# Patient Record
Sex: MALE | Race: WHITE
[De-identification: names, ages, dates, MRNs, and addresses within clinical notes are randomized per-mention and may not be internally consistent; named-entity substitution may affect disease eponyms.]

---

## 2017-06-05 ENCOUNTER — HOSPITAL ENCOUNTER (EMERGENCY)
Dept: HOSPITAL 58 - ED | Age: 52
Discharge: HOME | End: 2017-06-05
Payer: COMMERCIAL

## 2017-06-05 VITALS — DIASTOLIC BLOOD PRESSURE: 99 MMHG | TEMPERATURE: 99.1 F | SYSTOLIC BLOOD PRESSURE: 150 MMHG

## 2017-06-05 VITALS — BODY MASS INDEX: 31.5 KG/M2
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
## 2017-06-05 DIAGNOSIS — F17.210: ICD-10-CM

## 2017-06-05 DIAGNOSIS — S61.022A: Primary | ICD-10-CM

## 2017-06-05 PROCEDURE — 96372 THER/PROPH/DIAG INJ SC/IM: CPT

## 2017-06-05 PROCEDURE — 99282 EMERGENCY DEPT VISIT SF MDM: CPT

## 2017-06-05 NOTE — DI
EXAM:  Three views of the left thumb. 

  

History: Left thumb trauma. 

  

Findings:  No acute fracture or dislocation.  There are two metallic round radiopaque structures viviana
t could be external to the patient projecting over the thumb.  Joint spaces are preserved. 

  

Impression:  No acute fracture.

## 2017-06-05 NOTE — ED.PDOC
General


ED Provider: 


Dr. TERESO TORRES JR





Chief Complaint: Finger Laceration


Stated Complaint: closed with superglue -cut his left thumb on  metal.  

tenderpainful moving thumb.  redness noted.[ End ]99.1 80 16 94% 150/99 9/10  

patient states wound was open and bleeding and he squirted a lot of superglue 

into laceration initially not painful now tender painful slightly swollen.  

disc with poison control- local wound care is non toxic hardens immediately per 

Bharat


Time Seen by Physician: 14:00


Mode of Arrival: Walk-In


Information Source: Patient


Exam Limitations: No limitations


Nursing and Triage Documentation Reviewed and Agree: No





Review of Systems





- Review Of Systems


Constitutional: Reports: No symptoms


Eyes: Reports: No symptoms


Ears, Nose, Mouth, Throat: Reports: No symptoms


Respiratory: Reports: No symptoms


Cardiac: Reports: No symptoms


GI: Reports: No symptoms


: Reports: No symptoms


Musculoskeletal: Reports: Other


Skin: Reports: Lesions (LEFT THUMB WITH 3 CM TENDER LESION CONSISTENT WITH 3 

DAY LACERATION NO OOZINGTENDER ALONG EDGES NO PALABLE FOREIGN BODY DRY 

MODERATELY TENDER)


Neurological: Reports: No symptoms (NEUROVASCULAR INTACT)


Endocrine: Reports: No symptoms


Hematologic/Lymphatic: Reports: No symptoms


All Other Systems: Other





Past Medical History





- Past Medical History


Previously Healthy: Yes


Endocrine: Reports: DM 2, Dyslipidemia


Cardiovascular: Reports: CAD, MI (MI X3   STINT X2    ), Hypertension


Respiratory: Reports: COPD


Hematological: Reports: None


Gastrointestinal: Reports: GERD


Genitourinary: Reports: None


Neuro/Psych: Reports: None


Musculoskeletal: Reports: Arthritis, Back Pain, Joint Pain


Cancer: Reports: None


Other Pertinent Past Medical History: PVD 





- Surgical History


General Surgical History: Reports: Appendectomy, Stent (x2), Orthopedic ( 

spinalfusion), Back Surgery





- Family History


Family History: Reports: None





- Social History


Smoking Status: Current every day smoker, Heavy tobacco smoker


Hx Substance Use: Yes ("smoke a little weed every now and then.")


Alcohol Screening: None





- Immunizations


Tetanus Shot up to Date: Yes (2 year ago)





Physical Exam





- Physical Exam


Appearance: Ill-appearing


Pain Distress: Moderate


Neck: Supple


Respiratory: Airway patent


Skin: Warm, Dry (NOTE LESION)


Neurological: Sensation intact, Motor intact, Reflexes intact, Cranial nerves 

intact, Alert, Oriented





Critical Care Note





- Critical Care Note


Total Time (mins): 0





Course





- Course


Orders, Labs, Meds: 


Orders











 Category Date Time Status


 


 Ketorolac Tromethamine [Toradol] MEDS  06/05/17 14:13 Discontinued





 60 mg IM ONCE STA   


 


 THUMB, LEFT Stat RADS  06/05/17 14:12 Completed








Medications














Discontinued Medications














Generic Name Dose Route Start Last Admin





  Trade Name Freq  PRN Reason Stop Dose Admin


 


Ketorolac Tromethamine  60 mg  06/05/17 14:13  06/05/17 14:24





  Toradol  IM  06/05/17 14:14  60 mg





  ONCE STA   Administration











Vital Signs: 


 











  Temp Pulse Resp BP Pulse Ox


 


 06/05/17 13:50  99.1 F  80  16  150/99 H  94 L














Departure





- Departure


Time of Disposition: 15:02


Disposition: HOME SELF-CARE


Discharge Problem: 


 Laceration of finger, Foreign object left in body during injection or 

vaccination





Instructions:  Cellulitis (ED), Soft Tissue Foreign Body (ED), Laceration 

Without Closure (ED)


Condition: Good


Pt referred to PMD for follow-up: Yes


Additional Instructions: 


ANTIBIOTIC UNTIL GONE


RECHECK PMD DISCUSS POSSIBLE REFERRAL FOR FOREIGN MATTER UNDER SKIN


MAY USE TOPICAL OINTMENT ON LACERATION 


DAILY BANDAGE


CYANOACRYLATE(SUPERGLUE) MAY CONE TO SURFACE DURING OR AFTER HEALING


Prescriptions: 


Naproxen [Naprosyn] 500 mg PO Q12HR PRN #30 tablet


 PRN Reason: PAIN


Cephalexin [Keflex] 500 mg PO QID #20 capsule


Allergies/Adverse Reactions: 


Allergies





cyclobenzaprine HCl [From Flexeril] Adverse Reaction (Verified 06/05/17 13:52)


 


diphenhydramine HCl [From Benadryl] Adverse Reaction (Verified 06/05/17 13:52)


 


morphine Adverse Reaction (Verified 06/05/17 13:52)


 


promethazine HCl [From Phenergan] Adverse Reaction (Verified 06/05/17 13:52)


 








Home Medications: 


Ambulatory Orders





Ticagrelor [Brilinta] 90 mg PO BID 05/08/14 


Carvedilol [Coreg] 3.125 mg PO BID 07/06/15 


Albuterol Sulfate [Albuterol Sulfate Hfa] 2 puff IH QID 12/29/15 


Hydrocodone/Acetaminophen [Norco 7.5-325 Tablet] 1 each PO Q4HR PRN 12/29/15 


Aspirin [Aspirin Chewable] 81 mg PO DAILYWM 02/18/16 


Atorvastatin Calcium [Lipitor] 20 mg PO DAILY 02/18/16 


Losartan Potassium [Cozaar] 100 mg PO DAILY 12/20/16 


Naproxen [Naprosyn] 500 mg PO Q12HR PRN #30 tablet 12/20/16 


Cephalexin [Keflex] 500 mg PO QID #20 capsule 06/05/17 


Clonazepam 1 mg PO TID 06/05/17 


Naproxen [Naprosyn] 500 mg PO Q12HR PRN #30 tablet 06/05/17

## 2017-07-31 ENCOUNTER — HOSPITAL ENCOUNTER (EMERGENCY)
Dept: HOSPITAL 58 - ED | Age: 52
Discharge: ANOTHER HOSPITAL | End: 2017-07-31
Payer: COMMERCIAL

## 2017-07-31 VITALS — SYSTOLIC BLOOD PRESSURE: 135 MMHG | DIASTOLIC BLOOD PRESSURE: 88 MMHG | TEMPERATURE: 97.8 F

## 2017-07-31 VITALS — BODY MASS INDEX: 30.8 KG/M2

## 2017-07-31 DIAGNOSIS — E78.5: ICD-10-CM

## 2017-07-31 DIAGNOSIS — I25.2: ICD-10-CM

## 2017-07-31 DIAGNOSIS — R07.9: Primary | ICD-10-CM

## 2017-07-31 DIAGNOSIS — E11.9: ICD-10-CM

## 2017-07-31 DIAGNOSIS — I10: ICD-10-CM

## 2017-07-31 DIAGNOSIS — I25.10: ICD-10-CM

## 2017-07-31 DIAGNOSIS — F17.210: ICD-10-CM

## 2017-07-31 DIAGNOSIS — Z95.5: ICD-10-CM

## 2017-07-31 DIAGNOSIS — E87.6: ICD-10-CM

## 2017-07-31 DIAGNOSIS — Z79.899: ICD-10-CM

## 2017-07-31 LAB
ALBUMIN SERPL-MCNC: 4 G/DL (ref 3.4–5)
ALBUMIN/GLOB SERPL: 1.14 {RATIO}
ALP SERPL-CCNC: 64 U/L (ref 50–136)
ALT SERPL-CCNC: 15 U/L (ref 12–78)
ANION GAP SERPL CALC-SCNC: 15.9 MMOL/L
AST SERPL-CCNC: 13 U/L (ref 15–37)
BASOPHILS # BLD AUTO: 0.1 K/UL (ref 0–0.2)
BASOPHILS NFR BLD AUTO: 0.6 % (ref 0–3)
BILIRUB SERPL-MCNC: 0.45 MG/DL (ref 0–1.2)
BUN SERPL-MCNC: 17 MG/DL (ref 7–18)
BUN/CREAT SERPL: 17.17
CALCIUM SERPL-MCNC: 10.9 MG/DL (ref 8.2–10.2)
CHLORIDE SERPL-SCNC: 102 MMOL/L (ref 98–107)
CK MB SERPL-MCNC: 2.3 NG/ML (ref 0–3.6)
CK SERPL-CCNC: 119 U/L
CO2 BLD-SCNC: 29 MMOL/L (ref 21–32)
CREAT SERPL-MCNC: 0.99 MG/DL (ref 0.6–1.1)
EOSINOPHIL # BLD AUTO: 0.2 K/UL (ref 0–0.7)
EOSINOPHIL NFR BLD AUTO: 2.1 % (ref 0–7)
GFR SERPLBLD BASED ON 1.73 SQ M-ARVRAT: 79 ML/MIN
GLOBULIN SER CALC-MCNC: 3.5 G/L
GLUCOSE SERPL-MCNC: 103 MG/DL (ref 70–100)
HCT VFR BLD AUTO: 43.5 % (ref 42–52)
HGB BLD-MCNC: 15.5 G/DL (ref 14–18)
IMM GRANULOCYTES NFR BLD AUTO: 0.4 % (ref 0–5)
LYMPHOCYTES # SPEC AUTO: 2.6 K/UL (ref 0.6–3.4)
LYMPHOCYTES NFR BLD AUTO: 32.2 % (ref 10–50)
MCH RBC QN: 29.5 PG (ref 27–31)
MCHC RBC AUTO-ENTMCNC: 35.6 G/DL (ref 31.8–35.4)
MCV RBC: 82.7 FL (ref 80–94)
MONOCYTES # BLD AUTO: 0.5 K/UL (ref 0.4–2)
MONOCYTES NFR BLD AUTO: 6.2 % (ref 0–10)
NEUTROPHILS # BLD AUTO: 4.8 K/UL (ref 2–6.9)
NEUTROPHILS NFR BLD AUTO: 58.5 %
PLATELET # BLD AUTO: 195 10^3/UL (ref 140–440)
POTASSIUM SERPL-SCNC: 2.9 MMOL/L (ref 3.5–5.1)
PROT SERPL-MCNC: 7.5 G/DL (ref 6.4–8.2)
RBC # BLD AUTO: 5.26 10^6/UL (ref 4.7–6.1)
SODIUM SERPL-SCNC: 144 MMOL/L (ref 136–145)
WBC # BLD AUTO: 8.13 K/UL (ref 4.2–10.2)

## 2017-07-31 PROCEDURE — 99285 EMERGENCY DEPT VISIT HI MDM: CPT

## 2017-07-31 PROCEDURE — 85025 COMPLETE CBC W/AUTO DIFF WBC: CPT

## 2017-07-31 PROCEDURE — 36415 COLL VENOUS BLD VENIPUNCTURE: CPT

## 2017-07-31 PROCEDURE — 84484 ASSAY OF TROPONIN QUANT: CPT

## 2017-07-31 PROCEDURE — 82550 ASSAY OF CK (CPK): CPT

## 2017-07-31 PROCEDURE — 80053 COMPREHEN METABOLIC PANEL: CPT

## 2017-07-31 PROCEDURE — 93005 ELECTROCARDIOGRAM TRACING: CPT

## 2017-07-31 PROCEDURE — 96365 THER/PROPH/DIAG IV INF INIT: CPT

## 2017-07-31 PROCEDURE — 82553 CREATINE MB FRACTION: CPT

## 2017-07-31 PROCEDURE — 93010 ELECTROCARDIOGRAM REPORT: CPT

## 2017-07-31 PROCEDURE — 96375 TX/PRO/DX INJ NEW DRUG ADDON: CPT

## 2017-07-31 NOTE — DI
EXAM:  Two-view chest. 

  

HISTORY:  Pain. 

  

DATE:  07/31/2017. 

  

COMPARISON:  CT of the chest obtained on 04/03/2016. 

  

TECHNIQUE:  PA and lateral views of the chest. 

  

FINDINGS:  No focal consolidation, pleural effusion, or pneumothorax is identified.  The heart size 
is normal.  There are mild degenerative changes of the thoracic spine. 

  

IMPRESSION:  No acute cardiopulmonary findings.

## 2017-07-31 NOTE — ED.PDOC
General


ED Provider: 


Dr. ELIZ FIGUEROA





Chief Complaint: Chest Pain


Stated Complaint: CHEST PAIN


Time Seen by Physician: 15:00 (1 DAY AGO HAS   HX OF CAD AND STENTS )


Mode of Arrival: Walk-In


Information Source: Patient


Exam Limitations: No limitations


Primary Care Provider: 


AGUILA GONCALVES





Nursing and Triage Documentation Reviewed and Agree: Yes





Cardiovascular Complaint Exam





- Chest Pain Complaint/Exam


Onset: Gradual


Symptoms Are: Still present


Initial Severity: Moderate


Current Severity: Moderate


Location: Reports: Midsternal


Pain Radiates: Reports: None


Character: Reports: Dull


Aggravating: Reports: None


Alleviating: Reports: None


Associated Signs and Symptoms: Denies: Diaphoresis, Nausea, Vomiting, Fever, 

Palpitations, Cough, Hemoptysis, Back pain, Abdominal pain, Dizziness, Short of 

air, Calf pain, Calf swelling


Related History: Reports: Similar episode


Related Surgical History: Reports: PTCA/Stent (REMOTE HISTORY)


History of Healthcare-Acquired Pneumonia: Reports: No


TAD Risk Factors: Reports: Hypertension


Pulmonary Embolism Risk Factors: Reports: None


Prior Care for this Complaint: Yes (CAD/MI)


Recent Stress Test: No


Recent Echo/LV Function: No


JVD Present: No


Subcutaneous Emphysema Present: No


Diminshed Breath Sounds: No


Reproducible Chest Wall Pain: No


Bilateral Pulses Present: No


Unequal Pulses Noted: No


If Risk Factors for AMI/ACS Consider: EKG, Cardiac Enzymes


Differential Diagnoses: Acute MI, Unstable Angina, CHF, Lower Resp. Infection


Quality Indicators For Acute MI or Cardiac Chest Pain: EKG in 10min.


Quality Indicator For Non-Traumatic Chest Pain/Syncope: EKG Performed





Review of Systems





- Review Of Systems


Constitutional: Reports: No symptoms


Eyes: Reports: No symptoms


Ears, Nose, Mouth, Throat: Reports: No symptoms


Respiratory: Reports: No symptoms


Cardiac: Reports: Chest pain


GI: Reports: No symptoms


: Reports: No symptoms


Musculoskeletal: Reports: No symptoms


Skin: Reports: No symptoms


Neurological: Reports: No symptoms


Endocrine: Reports: No symptoms


Hematologic/Lymphatic: Reports: No symptoms


All Other Systems: Reviewed and Negative





Past Medical History





- Past Medical History


Previously Healthy: Yes


Endocrine: Reports: DM 2, Dyslipidemia


Cardiovascular: Reports: CAD, MI (MI X3   STINT X2    ), Hypertension


Respiratory: Reports: COPD


Hematological: Reports: None


Gastrointestinal: Reports: GERD


Genitourinary: Reports: None


Neuro/Psych: Reports: None


Musculoskeletal: Reports: Arthritis, Back Pain, Joint Pain


Cancer: Reports: None


Other Pertinent Past Medical History: PVD 





- Surgical History


General Surgical History: Reports: Appendectomy, Stent (x2), Orthopedic ( 

spinalfusion), Back Surgery





- Family History


Family History: Reports: None





- Social History


Smoking Status: Current every day smoker, Heavy tobacco smoker


Hx Substance Use: Yes ("smoke a little weed every now and then.")


Alcohol Screening: None





Physical Exam





- Physical Exam


Appearance: Well-appearing, No pain distress, Well-nourished


Eyes: ALIN, EOMI, Conjunctiva clear


ENT: Ears normal, Nose normal, Oropharynx normal


Respiratory: Airway patent, Breath sounds clear, Breath sounds equal, 

Respirations nonlabored


Cardiovascular: RRR, Pulses normal, No rub, No murmur


GI/: Soft, Nontender, No masses, Bowel sounds normal, No Organomegaly


Musculoskeletal: Normal strength, ROM intact, No edema, No calf tenderness


Skin: Warm, Dry, Normal color


Neurological: Sensation intact, Motor intact, Reflexes intact, Cranial nerves 

intact, Alert, Oriented


Psychiatric: Affect appropriate, Mood appropriate





Interpretation





- Cardiac Monitor


Rate: Normal


Rhythm: Sinus


Ectopy: None





- EKG Interpretation


Rate: Normal


Rhythm: Sinus


Ectopy: None


Axis: NL


ST Segment: Normal





Physician Notification





- Case Discussed


Physician Notified: cheli THOMAS


Time of Notification: 16:57 (TRANSFER NOW )





Critical Care Note





- Critical Care Note


Total Time (mins): 0





Course





- Course


Hematology/Chemistry: 


 07/31/17 15:12





 07/31/17 15:12


Orders, Labs, Meds: 


Lab Review











  07/31/17





  15:12


 


WBC  8.13


 


RBC  5.26


 


Hgb  15.5


 


Hct  43.5


 


MCV  82.7


 


MCH  29.5


 


MCHC  35.6 H


 


RDW Coeff of Portia  13.4


 


Plt Count  195


 


Immature Gran % (Auto)  0.4


 


Neut % (Auto)  58.5


 


Lymph % (Auto)  32.2


 


Mono % (Auto)  6.2


 


Eos % (Auto)  2.1


 


Baso % (Auto)  0.6


 


Immature Gran # (Auto)  0.0


 


Neut #  4.8


 


Lymph #  2.6


 


Mono #  0.5


 


Eos #  0.2


 


Baso #  0.1


 


Sodium  144


 


Potassium  2.9 L


 


Chloride  102


 


Carbon Dioxide  29


 


Anion Gap  15.9


 


BUN  17


 


Creatinine  0.99


 


Estimated GFR (MDRD)  79.00


 


BUN/Creatinine Ratio  17.17


 


Glucose  103 H


 


Calcium  10.9 H


 


Total Bilirubin  0.45


 


AST  13 L


 


ALT  15


 


Alkaline Phosphatase  64


 


Total Creatine Kinase  119


 


CK-MB (CK-2)  2.3


 


CK-MB (CK-2) %  1.81666


 


Troponin I  < 0.0100


 


Total Protein  7.5


 


Albumin  4.0


 


Globulin  3.5


 


Albumin/Globulin Ratio  1.14








Orders











 Category Date Time Status


 


 EKG-(ED ONLY) Stat CARDIO  07/31/17 15:08 Completed


 


 CBC W/ AUTO DIFF Stat LAB  07/31/17 15:12 Completed


 


 COMPREHENSIVE METABOLIC PANEL Stat LAB  07/31/17 15:12 Completed


 


 CREATINE KINASE Stat LAB  07/31/17 15:12 Completed


 


 TROPONIN I Stat LAB  07/31/17 15:12 Completed


 


 Aspirin [Aspirin Chewable] MEDS  07/31/17 15:20 Discontinued





 324 mg PO ONCE STA   


 


 Nitroglycerin [Nitrostat] MEDS  07/31/17 15:23 Discontinued





 0.4 mg SL ONCE STA   


 


 Potassium Chloride [Potassium Chloride Premix Run] 10 MEDS  07/31/17 16:30 

Active





 meq   





 Premix 100 ml Water 1 bag   





 IV ONCE   


 


 Potassium Chloride [Potassium Chloride Premix Run] 100 MEDS  07/31/17 16:48 

Discontinued





 ml   





 IV .STK-MED   


 


 CHEST, 2 VIEWS PA & LAT Stat RADS  07/31/17 15:07 Completed








Medications











Generic Name Dose Route Start Last Admin





  Trade Name Freq  PRN Reason Stop Dose Admin


 


Potassium Chloride 10 meq/  100 mls @ 100 mls/hr  07/31/17 16:30  





  Sterile Water  IV  07/31/17 17:29  





  ONCE STA   














Discontinued Medications














Generic Name Dose Route Start Last Admin





  Trade Name Freq  PRN Reason Stop Dose Admin


 


Aspirin  324 mg  07/31/17 15:20  07/31/17 15:28





  Aspirin Chewable  PO  07/31/17 15:21  324 mg





  ONCE STA   Administration


 


Nitroglycerin  0.4 mg  07/31/17 15:23  07/31/17 15:29





  Nitrostat  SL  07/31/17 15:24  0.4 mg





  ONCE STA   Administration











Vital Signs: 


 











  Temp Pulse Resp BP Pulse Ox


 


 07/31/17 14:51  97.8 F  90  16  135/88  98














LUISANA Risk Score


LUISANA Risk Score: 


Risk Score      Odds of death by 30D


      0                 0.1 (0.1-0.2)


      1                 0.3 (0.2-0.3)


      2                 0.4 (0.3-0.5)


      3                 0.7 (0.6-0.9)


      4                 1.2 (1.0-1.5)


      5                 2.2 (1.9-2.6)


      6                 3.0 (2.5-3.6)


      7                 4.8 (3.8-6.1)








Departure





- Departure


Time of Disposition: 18:00


Disposition: TSF SHORT-TRM HOSP


Discharge Problem: 


 Chest pain, Hypokalemia





Instructions:  Chest Pain (ED)


Condition: Good


Pt referred to PMD for follow-up: Yes


Additional Instructions: 


Please call your Family Physician as soon as possible to schedule a follow-up 

appointment.


Allergies/Adverse Reactions: 


Allergies





cyclobenzaprine HCl [From Flexeril] Adverse Reaction (Verified 07/31/17 14:54)


 


diphenhydramine HCl [From Benadryl] Adverse Reaction (Verified 07/31/17 14:54)


 


morphine Adverse Reaction (Verified 07/31/17 14:54)


 


promethazine HCl [From Phenergan] Adverse Reaction (Verified 07/31/17 14:54)


 








Home Medications: 


Ambulatory Orders





Ticagrelor [Brilinta] 90 mg PO BID 05/08/14 


Carvedilol [Coreg] 3.125 mg PO BID 07/06/15 


Albuterol Sulfate [Albuterol Sulfate Hfa] 2 puff IH QID 12/29/15 


Hydrocodone/Acetaminophen [Norco 7.5-325 Tablet] 1 each PO Q4HR PRN 12/29/15 


Aspirin [Aspirin Chewable] 81 mg PO DAILYWM 02/18/16 


Atorvastatin Calcium [Lipitor] 20 mg PO DAILY 02/18/16 


Losartan Potassium [Cozaar] 100 mg PO DAILY 12/20/16 


Clonazepam 1 mg PO TID 06/05/17 


Naproxen [Naprosyn] 500 mg PO Q12HR PRN #30 tablet 06/05/17 








Disposition Discussed With: Patient, Family

## 2018-02-01 ENCOUNTER — HOSPITAL ENCOUNTER (OUTPATIENT)
Dept: HOSPITAL 58 - LAB | Age: 53
Discharge: HOME | End: 2018-02-01
Attending: NURSE PRACTITIONER

## 2018-02-01 VITALS — BODY MASS INDEX: 30.8 KG/M2

## 2018-02-01 DIAGNOSIS — E87.6: Primary | ICD-10-CM

## 2018-02-01 PROCEDURE — 36415 COLL VENOUS BLD VENIPUNCTURE: CPT

## 2018-02-01 PROCEDURE — 84132 ASSAY OF SERUM POTASSIUM: CPT
